# Patient Record
Sex: FEMALE | Race: WHITE | Employment: FULL TIME | URBAN - METROPOLITAN AREA
[De-identification: names, ages, dates, MRNs, and addresses within clinical notes are randomized per-mention and may not be internally consistent; named-entity substitution may affect disease eponyms.]

---

## 2020-07-12 ENCOUNTER — APPOINTMENT (OUTPATIENT)
Dept: GENERAL RADIOLOGY | Facility: CLINIC | Age: 50
End: 2020-07-12
Attending: NURSE PRACTITIONER
Payer: COMMERCIAL

## 2020-07-12 ENCOUNTER — HOSPITAL ENCOUNTER (EMERGENCY)
Facility: CLINIC | Age: 50
Discharge: HOME | End: 2020-07-12
Attending: NURSE PRACTITIONER | Admitting: NURSE PRACTITIONER
Payer: COMMERCIAL

## 2020-07-12 VITALS
BODY MASS INDEX: 24.75 KG/M2 | TEMPERATURE: 99.2 F | HEART RATE: 81 BPM | SYSTOLIC BLOOD PRESSURE: 175 MMHG | OXYGEN SATURATION: 95 % | HEIGHT: 61 IN | RESPIRATION RATE: 18 BRPM | DIASTOLIC BLOOD PRESSURE: 102 MMHG
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
## 2020-07-12 DIAGNOSIS — S82.839A AVULSION FRACTURE OF DISTAL FIBULA: ICD-10-CM

## 2020-07-12 DIAGNOSIS — S63.501A WRIST SPRAIN, RIGHT, INITIAL ENCOUNTER: ICD-10-CM

## 2020-07-12 DIAGNOSIS — M25.571 RIGHT ANKLE PAIN: ICD-10-CM

## 2020-07-12 PROCEDURE — 99285 EMERGENCY DEPT VISIT HI MDM: CPT | Mod: 25

## 2020-07-12 PROCEDURE — 73130 X-RAY EXAM OF HAND: CPT | Mod: RT

## 2020-07-12 PROCEDURE — 25000132 ZZH RX MED GY IP 250 OP 250 PS 637: Performed by: NURSE PRACTITIONER

## 2020-07-12 PROCEDURE — 27786 TREATMENT OF ANKLE FRACTURE: CPT | Mod: RT

## 2020-07-12 PROCEDURE — 73610 X-RAY EXAM OF ANKLE: CPT | Mod: RT

## 2020-07-12 PROCEDURE — 29125 APPL SHORT ARM SPLINT STATIC: CPT | Mod: RT

## 2020-07-12 PROCEDURE — 73110 X-RAY EXAM OF WRIST: CPT | Mod: RT

## 2020-07-12 RX ORDER — VENLAFAXINE HYDROCHLORIDE 75 MG/1
75 CAPSULE, EXTENDED RELEASE ORAL
COMMUNITY
Start: 2020-01-20 | End: 2021-01-19

## 2020-07-12 RX ORDER — IBUPROFEN 600 MG/1
600 TABLET, FILM COATED ORAL ONCE
Status: COMPLETED | OUTPATIENT
Start: 2020-07-12 | End: 2020-07-12

## 2020-07-12 RX ORDER — HYDROCODONE BITARTRATE AND ACETAMINOPHEN 5; 325 MG/1; MG/1
1 TABLET ORAL EVERY 6 HOURS PRN
Qty: 10 TABLET | Refills: 0 | Status: SHIPPED | OUTPATIENT
Start: 2020-07-12 | End: 2020-07-15

## 2020-07-12 RX ADMIN — IBUPROFEN 600 MG: 600 TABLET ORAL at 16:21

## 2020-07-12 ASSESSMENT — ENCOUNTER SYMPTOMS
ARTHRALGIAS: 1
VOMITING: 0
WOUND: 0
HEADACHES: 0
NECK PAIN: 0

## 2020-07-12 NOTE — DISCHARGE INSTRUCTIONS
Take Ibuprofen as needed for pain.     Take Norco for increased severe pain.     Cold packs and elevate the foot and wrist when at rest. Walking boot and crutches as needed for the right ankle which shows a distal fibula avulsion fracture.

## 2020-07-12 NOTE — ED NOTES
Bed: FT04  Expected date: 7/12/20  Expected time: 3:53 PM  Means of arrival: Ambulance  Comments:  516 49f ankle injury ETA 1600

## 2020-07-12 NOTE — ED PROVIDER NOTES
"  History   Chief Complaint  Ankle Pain (rolled over right ankle, swollen, painful) and Wrist Pain (fell onto right wrist, slightly swollen, painful)    The history is provided by the patient.      Mildred Rodgers is a 49 year old female who presents for evaluation after tripping over an uneven side walk while on walk causing her to roll her right ankle and fall on her right hand. She now has pain and swelling in her right ankle. She had difficulty getting up after the fall, but once someone helped her up she was able to walk about 20 feet to a bench. However, walking was a challenge for her. She also has pain to the volar side of her right wrist and finds it challenging to move due to the pain. She did not hit her head or lose consciousness. No headache, neck pain, or subsequent vomiting.     Allergies  No Known Drug Allergies    Medications  Effexor XR    Past Medical History  HPV positive   Anxiety and depression   Plantar fasciitis     Past Surgical History  History reviewed. No pertinent surgical history.    Family History  Father - lewy body dementia  Mother - neurofibromatosis     Social History  The patient was unaccompanied to the ED.  Smoking Status: former smoker  Smokeless Tobacco: never  Marital Status: single    Review of Systems   Gastrointestinal: Negative for vomiting.   Musculoskeletal: Positive for arthralgias and gait problem. Negative for neck pain.   Skin: Negative for wound.   Neurological: Negative for syncope and headaches.   All other systems reviewed and are negative.      Physical Exam     Patient Vitals for the past 24 hrs:   BP Temp Temp src Pulse Resp SpO2 Height   07/12/20 1614 (!) 175/102 99.2  F (37.3  C) Oral 81 18 95 % 1.549 m (5' 1\")     Physical Exam    Physical Exam   Constitutional: Pt appears well-developed and well-nourished.  Head: Atraumatic. Head moves freely with normal range of motion. No battles signs. No Racoons eyes.   ENT: Oropharynx is clear and moist. Nose " with no deformity. No hemotympanum.  Eyes: Conjunctivae pink. EOMs intact. Pupils are equal, round, and reactive to light.  Neck: Normal range of motion. No midline C Spine tenderness, step-off or crepitus.   Cardiovascular: Regular rate and rhythm. Normal heart sounds. No concerning  murmur heard. Intact distal pulses: radial and pedal pulses 2+ on the right.   Pulmonary/Chest: No respiratory distress.  Breath sounds normal. No decreased breath sounds. No wheezes. No rhonchi. No rales. No chest wall tenderness or crepitus.    Abdominal: Soft. Non-tender. No rebound, no guarding.   Musculoskeletal: Right lateral malleolus with edema and tenderness. Able to plantar and dorsal flex the foot and wiggle the toes. No pain with tib/fib squeeze. Normal pedal pulses. Right wrist with no swelling, tender at radial aspect although no pain at anatomic snuff box. Pain into 1st MT and MTP joint. No increased laxity of the MTP joint to suspect gamekeepers thumb. Distal capillary refill and sensation intact.   Neurological: Oriented to person, place, and time. No focal deficits.   Skin: Skin is warm.    Emergency Department Course     Imaging:  Radiology findings were communicated with the patient who voiced understanding of the findings.    Right ankle XR:  IMPRESSION: Soft tissue swelling over the lateral malleolus. Tiny osseous bodies adjacent to the lateral malleolus and lateral talus likely represent age-indeterminate avulsion fractures. There is normal joint spacing and alignment. The ankle mortise is   congruent. The talar dome is unremarkable. Small plantar calcaneal enthesophyte.   Report per radiology     Right wrist XR:  IMPRESSION: Normal joint spaces and alignment. No fracture.   Report per radiology     Right hand XR:  IMPRESSION: No acute fracture or malalignment. No significant degenerative changes.   Report per radiology     Interventions:  1621 Ibuprofen 600 mg PO    Emergency Department Course:  Past medical  records, nursing notes, and vitals reviewed.    1609 I physically examined the patient as documented above.    The patient was sent for radiographs while in the emergency department, results above.     1655 I rechecked the patient and discussed the findings of their workup thus far.     Findings and plan explained to the Patient. Patient discharged home with instructions regarding supportive care, medications, and reasons to return. The importance of close follow-up was reviewed. The patient was prescribed Norco.     I personally reviewed the imaging results with the Patient and answered all related questions prior to discharge.     Impression & Plan     Medical Decision Making:  Mildred Rodgers is a 49 year old female with mechanical trip and fall causing right ankle pain and edema and right wrist pain. No head injury. C spine clinically cleared. Exam with lateral malleolus swollen and tender. Xray reveals avulsion fracture distal fibula and I discussed this with the patient. She will be placed in a walking boot and given crutches to weight bear as tolerated. Right wrist and hand Xray is negative. No exam concerns for gamekeepers thumb or navicular fracture. Discussed treatment for wrist sprain with Velcro wrist splint, ice, elevated, ibuprofen. We discussed reasons to return here and need for Orthopedic follow up. She is amenable to plan.     Patient tearful and in pain when initial BP of 175/102 taken. No hx of HTN.     Diagnosis:    ICD-10-CM    1. Fall on same level from slipping, tripping or stumbling, initial encounter  W01.0XXA    2. Right ankle pain  M25.571    3. Wrist sprain, right, initial encounter  S63.501A    4. Avulsion fracture of distal fibula  S82.839A      Disposition:  Discharged to home.    Discharge Medications:  New Prescriptions    HYDROCODONE-ACETAMINOPHEN (NORCO) 5-325 MG TABLET    Take 1 tablet by mouth every 6 hours as needed for severe pain       Scribe Disclosure:  Sophia MOONEY  Katarina, am serving as a scribe at 4:06 PM on 7/12/2020 to document services personally performed by Linh Mathews NP based on my observations and the provider's statements to me.      Linh Mathews, APRN CNP  07/12/20 2054

## 2020-07-12 NOTE — ED AVS SNAPSHOT
Emergency Department  [address] [facility] [address]  Phone:  [phone]  Fax:  [phone]                                    Mildred Rodgers   MRN: [id number]    Department:   Emergency Department   Date of Visit:  7/12/2020           After Visit Summary Signature Page    I have received my discharge instructions, and my questions have been answered. I have discussed any challenges I see with this plan with the nurse or doctor.    ..........................................................................................................................................  Patient/Patient Representative Signature      ..........................................................................................................................................  Patient Representative Print Name and Relationship to Patient    ..................................................               ................................................  Date                                   Time    ..........................................................................................................................................  Reviewed by Signature/Title    ...................................................              ..............................................  Date                                               Time          22EPIC Rev 08/18